# Patient Record
Sex: FEMALE | Race: WHITE | HISPANIC OR LATINO | URBAN - METROPOLITAN AREA
[De-identification: names, ages, dates, MRNs, and addresses within clinical notes are randomized per-mention and may not be internally consistent; named-entity substitution may affect disease eponyms.]

---

## 2023-01-31 ENCOUNTER — EMERGENCY (EMERGENCY)
Facility: HOSPITAL | Age: 28
LOS: 1 days | Discharge: HOME | End: 2023-01-31
Attending: EMERGENCY MEDICINE
Payer: MEDICAID

## 2023-01-31 VITALS
TEMPERATURE: 98 F | DIASTOLIC BLOOD PRESSURE: 61 MMHG | SYSTOLIC BLOOD PRESSURE: 95 MMHG | OXYGEN SATURATION: 99 % | RESPIRATION RATE: 18 BRPM | HEART RATE: 81 BPM

## 2023-01-31 VITALS
SYSTOLIC BLOOD PRESSURE: 93 MMHG | HEART RATE: 78 BPM | OXYGEN SATURATION: 98 % | TEMPERATURE: 98 F | DIASTOLIC BLOOD PRESSURE: 61 MMHG | RESPIRATION RATE: 18 BRPM

## 2023-01-31 PROCEDURE — 72170 X-RAY EXAM OF PELVIS: CPT

## 2023-01-31 PROCEDURE — 72170 X-RAY EXAM OF PELVIS: CPT | Mod: 26

## 2023-01-31 PROCEDURE — 99284 EMERGENCY DEPT VISIT MOD MDM: CPT

## 2023-01-31 PROCEDURE — 99283 EMERGENCY DEPT VISIT LOW MDM: CPT

## 2023-01-31 RX ORDER — LIDOCAINE 4 G/100G
1 CREAM TOPICAL ONCE
Refills: 0 | Status: COMPLETED | OUTPATIENT
Start: 2023-01-31 | End: 2023-01-31

## 2023-01-31 RX ORDER — ACETAMINOPHEN 500 MG
975 TABLET ORAL ONCE
Refills: 0 | Status: COMPLETED | OUTPATIENT
Start: 2023-01-31 | End: 2023-01-31

## 2023-01-31 RX ADMIN — Medication 975 MILLIGRAM(S): at 14:00

## 2023-01-31 RX ADMIN — Medication 975 MILLIGRAM(S): at 13:00

## 2023-01-31 RX ADMIN — LIDOCAINE 1 PATCH: 4 CREAM TOPICAL at 13:00

## 2023-01-31 NOTE — ED PROVIDER NOTE - PROGRESS NOTE DETAILS
PI [id number]  Spoke with pt, mild improvement with Tylenol and lidocaine patch.  Went over Xray findings discussed treatment at home, follow up with spine center and discussed return precautions, pt was given opportunity to ask questions and all were answered.  Sudeep

## 2023-01-31 NOTE — ED PROVIDER NOTE - NSFOLLOWUPINSTRUCTIONS_ED_ALL_ED_FT
1- Rest, Ice  2- Tylenol as needed for pain do not exceeded 4 grams of Tylenol a day, consult package for dosing instructions    3- Salon Pas patches  4- Follow up with our spine center  5- Return to ER for any new or worsening complaints

## 2023-01-31 NOTE — ED ADULT NURSE NOTE - OBJECTIVE STATEMENT
28yo F aaox4 denies PMHx, presents to ED, [facility]LE[facility] , pt is [place] speaking, RN offered translation services, pt refused, "OK" that covering  RN translates:    as per pt today she was walking down stairs, pt trip/fall x 6 steps, falling backwards, hitting the buttocks area, at this time pt c/p L buttock area, denies hit the head , LOC, AC , able to ambulate after the incident Pt denies CP, SOB, HA, vision changes, n/v/d, fevers chills, abdominal pain, weakness, dizziness. Safety and comfort measures initiated- bed placed in lowest position and side rails raised.

## 2023-01-31 NOTE — ED PROVIDER NOTE - PATIENT PORTAL LINK FT
You can access the FollowMyHealth Patient Portal offered by [facility] [place] by registering at the following website: [email or web address]. By joining [employer]’s FollowMyHealth portal, you will also be able to view your health information using other applications (apps) compatible with our system.

## 2023-01-31 NOTE — ED PROVIDER NOTE - NSFOLLOWUPCLINICS_GEN_ALL_ED_FT
[facility]  Neurosurgery/Spine  [address], [address]  [place] [address]  Phone: [phone]  Fax:

## 2023-01-31 NOTE — ED ADULT NURSE REASSESSMENT NOTE - NS ED NURSE REASSESS COMMENT FT1
pt educated about to removed lido patch  12 hours after ( tomorrow at 0100) , pt verbalized understanding

## 2023-01-31 NOTE — ED PROVIDER NOTE - CLINICAL SUMMARY MEDICAL DECISION MAKING FREE TEXT BOX
haroon 27 f post partum 4 weeks breastdfeeding no pmhx wityh mechanical fall down non carpeted stairs onto back - no loc amb after no cp or lightheadedness before injuiry - pt with l lower back pain no numbness no weakness no bowel or bladder complaint - neck supple cleared clincially gcs 15 - bs bl no abd ttp- no hip ttp - no shortening or rotaional deformity - pain along si on l and left pelvis no midline lumbar or thoracic ttp =-  will image to eval for avulsion fx- and anlgesia for supportive care-

## 2023-01-31 NOTE — ED PROVIDER NOTE - NS ED ATTENDING STATEMENT MOD
This was a shared visit with the LC. I reviewed and verified the documentation and independently performed the documented:

## 2023-01-31 NOTE — ED PROVIDER NOTE - OBJECTIVE STATEMENT
27-year-old female no past medical history status post vaginal delivery 26 days ago a successful baby at home currently breast-feeding coming in with left-sided lower back pain after a slip and fall down 7 steps on her buttocks.  Patient did not hit her head.  Has been ambulatory since the fall.  Pain is worse with ambulation nothing makes it better.  No numbness or weakness.  No saddle anesthesia no incontinence.  No history of prior lower back issues.  PI [id number]

## 2023-01-31 NOTE — ED PROVIDER NOTE - MUSCULOSKELETAL, MLM
No midline spinal tenderness, NEXUS negative.  + tender over left SI joint. Chest and pelvis non tender and stable.  b/l UE's with full ROM and non tender throughout.  b/l LE's full ROM NT throughout.

## 2024-08-15 NOTE — ED ADULT NURSE NOTE - NSSUHOSCREENINGYN_ED_ALL_ED
Addended by: SAM ROSAS on: 8/15/2024 10:52 AM     Modules accepted: Orders    
Yes - the patient is able to be screened